# Patient Record
Sex: FEMALE | Race: WHITE
[De-identification: names, ages, dates, MRNs, and addresses within clinical notes are randomized per-mention and may not be internally consistent; named-entity substitution may affect disease eponyms.]

---

## 2018-01-09 ENCOUNTER — HOSPITAL ENCOUNTER (EMERGENCY)
Dept: HOSPITAL 25 - UCEAST | Age: 58
Discharge: HOME | End: 2018-01-09
Payer: COMMERCIAL

## 2018-01-09 VITALS — SYSTOLIC BLOOD PRESSURE: 163 MMHG | DIASTOLIC BLOOD PRESSURE: 70 MMHG

## 2018-01-09 DIAGNOSIS — T31.0: ICD-10-CM
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
## 2018-01-09 DIAGNOSIS — Z87.891: ICD-10-CM
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
## 2018-01-09 DIAGNOSIS — T22.112A: Primary | ICD-10-CM

## 2018-01-09 PROCEDURE — 99211 OFF/OP EST MAY X REQ PHY/QHP: CPT

## 2018-01-09 PROCEDURE — 99212 OFFICE O/P EST SF 10 MIN: CPT

## 2018-01-09 PROCEDURE — 16020 DRESS/DEBRID P-THICK BURN S: CPT

## 2018-01-09 PROCEDURE — G0463 HOSPITAL OUTPT CLINIC VISIT: HCPCS

## 2018-01-09 NOTE — UC
Skin Complaint HPI





- HPI Summary


HPI Summary: 





Pt presents with superficial burn to left forearm sustained about 1 hours prior 

to her arrival to urgent care. She was making a pot of hot coffee and the pot 

exploded. She was wearing a long sleeve white coat and coffee spilled onto her 

left distal forearm. No lacerations or injuries other than the burn. She 

applied ice and came to urgent care. Denies numbness, tingling, decreased ROM, 

or open wounds.





- History of Current Complaint


Chief Complaint: UCBurn


Time Seen by Provider: 01/09/18 11:38


Stated Complaint: BURN ON HAND


Hx Obtained From: Patient


Onset/Duration: Sudden Onset


Skin Exposure Onset/Duration: Hours Ago


Timing: Constant


Onset Severity: Moderate


Current Severity: Moderate


Pain Intensity: 8


Pain Scale Used: 0-10 Numeric





- Allergy/Home Medications


Allergies/Adverse Reactions: 


 Allergies











Allergy/AdvReac Type Severity Reaction Status Date / Time


 


Penicillins Allergy Severe Rash Verified 01/09/18 10:22














Review of Systems


Constitutional: Negative


Skin: Other - Burn over left forearm


Respiratory: Negative


Cardiovascular: Negative


Neurovascular: Negative


Musculoskeletal: Negative


All Other Systems Reviewed And Are Negative: Yes





PMH/Surg Hx/FS Hx/Imm Hx


Psychological History: Anxiety





- Surgical History


Surgical History: Yes


Surgery Procedure, Year, and Place: HYSTERECTOMY





- Social History


Lives: With Family


Alcohol Use: None


Substance Use Type: None


Smoking Status (MU): Former Smoker





Physical Exam


Triage Information Reviewed: Yes


Appearance: Well-Appearing, No Pain Distress, Well-Nourished


Vital Signs: 


 Initial Vital Signs











Temp  95.9 F   01/09/18 10:23


 


Pulse  74   01/09/18 10:23


 


Resp  18   01/09/18 10:23


 


Pulse Ox  100   01/09/18 10:23











Vital Signs Reviewed: Yes


Respiratory: Positive: Chest non-tender, Lungs clear, Normal breath sounds


Cardiovascular: Positive: RRR, No Murmur, Pulses Normal, Brisk Capillary Refill 

- Left forearm and hand


Musculoskeletal: Positive: Strength Intact - Left wrist/hand, ROM Intact - Left 

wrist/hand, No Edema - Left forearm/wrist/hand


Neurological: Positive: Alert, Other: - Sensations intact left distal forearm, 

hand, and all fingers


Psychological: Positive: Age Appropriate Behavior


Skin: Positive: Other - Superficial burn to left distal forearm, approx 7.0cm 

area of erythema. No open sores, blistering, breakdown of skin, or drainage.





Course/Dx





- Course


Course Of Treatment: Superficial burn to left distal forearm. Triple anbx 

ointment, telfa, and kerlix gauze wrap. Advised to keep covered for the next 24 

hours. If blisters, do not pop the blister. Cool compresses and ibuprofen for 

pain.





- Diagnoses


Provider Diagnoses: Superficial burn to left distal forearm





Discharge





- Discharge Plan


Condition: Stable


Disposition: HOME


Patient Education Materials:  Superficial Burn (ED)


Forms:  *Gen. Provider Communication


Referrals: 


Brandon Thibodeaux MD [Primary Care Provider] - 


Additional Instructions: 


If you develop a fever, shortness of breath, chest pain, new or worsening 

symptoms - please call your PCP or go to the ED.


 


Your blood pressure was high at todays visit. Please see your primary provider 

within 4 weeks for recheck and re-evaluation.